# Patient Record
Sex: MALE | Race: BLACK OR AFRICAN AMERICAN | NOT HISPANIC OR LATINO | Employment: FULL TIME | URBAN - METROPOLITAN AREA
[De-identification: names, ages, dates, MRNs, and addresses within clinical notes are randomized per-mention and may not be internally consistent; named-entity substitution may affect disease eponyms.]

---

## 2020-05-04 ENCOUNTER — TELEPHONE (OUTPATIENT)
Dept: UROLOGY | Facility: CLINIC | Age: 75
End: 2020-05-04

## 2020-05-04 NOTE — TELEPHONE ENCOUNTER
Spoke with pt and he advised that he wanted to get the same Rx as he did 3 years ago. Nurse advised that pt would have to be seen before Rx given since he has not had an appt since 2018. Pt verbalized understanding and appt scheduled 5/7/20. NB

## 2020-05-04 NOTE — TELEPHONE ENCOUNTER
Call returned and msg was left on VM to return call.     ----- Message from Venessa Olivas sent at 5/4/2020  1:20 PM CDT -----  Contact: Patient   Patient need to speak to the nurse regarding his meds. [phone]. Tks

## 2020-05-08 ENCOUNTER — OFFICE VISIT (OUTPATIENT)
Dept: UROLOGY | Facility: CLINIC | Age: 75
End: 2020-05-08
Payer: MEDICARE

## 2020-05-08 VITALS
SYSTOLIC BLOOD PRESSURE: 142 MMHG | BODY MASS INDEX: 16.7 KG/M2 | HEIGHT: 73 IN | WEIGHT: 126 LBS | HEART RATE: 59 BPM | DIASTOLIC BLOOD PRESSURE: 75 MMHG | RESPIRATION RATE: 18 BRPM

## 2020-05-08 DIAGNOSIS — N13.8 BPH WITH OBSTRUCTION/LOWER URINARY TRACT SYMPTOMS: Primary | ICD-10-CM

## 2020-05-08 DIAGNOSIS — N40.1 BPH WITH OBSTRUCTION/LOWER URINARY TRACT SYMPTOMS: Primary | ICD-10-CM

## 2020-05-08 LAB
BILIRUB UR QL STRIP: NEGATIVE
GLUCOSE UR QL STRIP: NEGATIVE
KETONES UR QL STRIP: NEGATIVE
LEUKOCYTE ESTERASE UR QL STRIP: NEGATIVE
PH, POC UA: 5.5
POC AMORP, URINE: ABNORMAL
POC BACTI, URINE: ABNORMAL
POC BLOOD, URINE: NEGATIVE
POC CASTS, URINE: ABNORMAL
POC CRYST, URINE: ABNORMAL
POC EPITH, URINE: ABNORMAL
POC HCG, URINE: ABNORMAL
POC HYALIN, URINE: 0 LPF
POC MUCUS, URINE: ABNORMAL
POC NITRATES, URINE: NEGATIVE
POC OTHER, URINE: ABNORMAL
POC RBC, URINE: 0 HPF
POC WBC, URINE: 0 HPF
PROT UR QL STRIP: NEGATIVE
SP GR UR STRIP: 1.02 (ref 1–1.03)
UROBILINOGEN UR STRIP-ACNC: 0.2 (ref 0.3–2.2)

## 2020-05-08 PROCEDURE — 1159F MED LIST DOCD IN RCRD: CPT | Mod: S$GLB,,, | Performed by: SPECIALIST

## 2020-05-08 PROCEDURE — 1101F PR PT FALLS ASSESS DOC 0-1 FALLS W/OUT INJ PAST YR: ICD-10-PCS | Mod: CPTII,S$GLB,, | Performed by: SPECIALIST

## 2020-05-08 PROCEDURE — 1126F AMNT PAIN NOTED NONE PRSNT: CPT | Mod: S$GLB,,, | Performed by: SPECIALIST

## 2020-05-08 PROCEDURE — 1159F PR MEDICATION LIST DOCUMENTED IN MEDICAL RECORD: ICD-10-PCS | Mod: S$GLB,,, | Performed by: SPECIALIST

## 2020-05-08 PROCEDURE — 1101F PT FALLS ASSESS-DOCD LE1/YR: CPT | Mod: CPTII,S$GLB,, | Performed by: SPECIALIST

## 2020-05-08 PROCEDURE — 1126F PR PAIN SEVERITY QUANTIFIED, NO PAIN PRESENT: ICD-10-PCS | Mod: S$GLB,,, | Performed by: SPECIALIST

## 2020-05-08 PROCEDURE — 99214 OFFICE O/P EST MOD 30 MIN: CPT | Mod: S$GLB,,, | Performed by: SPECIALIST

## 2020-05-08 PROCEDURE — 99214 PR OFFICE/OUTPT VISIT, EST, LEVL IV, 30-39 MIN: ICD-10-PCS | Mod: S$GLB,,, | Performed by: SPECIALIST

## 2020-05-08 RX ORDER — TAMSULOSIN HYDROCHLORIDE 0.4 MG/1
CAPSULE ORAL
COMMUNITY
Start: 2020-02-05 | End: 2020-06-15

## 2020-05-08 RX ORDER — ALPRAZOLAM 0.5 MG/1
TABLET ORAL
COMMUNITY
Start: 2020-03-30

## 2020-05-08 RX ORDER — DOXYLAMINE SUCCINATE AND PHENYLEPHRINE HYDROCHLORIDE 10.5; 1 MG/1; MG/1
1 TABLET ORAL 2 TIMES DAILY
COMMUNITY
Start: 2020-03-30

## 2020-05-08 RX ORDER — CELECOXIB 200 MG/1
CAPSULE ORAL
COMMUNITY
End: 2020-09-18

## 2020-05-08 RX ORDER — ESCITALOPRAM OXALATE 10 MG/1
TABLET ORAL
COMMUNITY
Start: 2020-03-31

## 2020-05-08 RX ORDER — ESZOPICLONE 3 MG/1
3 TABLET, FILM COATED ORAL NIGHTLY
COMMUNITY
Start: 2020-03-26

## 2020-05-08 RX ORDER — PREGABALIN 75 MG/1
CAPSULE ORAL
COMMUNITY
Start: 2020-04-09

## 2020-05-08 RX ORDER — CYCLOBENZAPRINE HCL 10 MG
TABLET ORAL
COMMUNITY
Start: 2020-03-30 | End: 2020-09-18

## 2020-05-08 NOTE — PROGRESS NOTES
Subjective:       Patient ID: Jian Regalado is a 75 y.o. male.    Chief Complaint: Other (Pt reports occasional difficulty when voiding. )      HPI: 75-year-old male patient known service Dr. Jon history of BPH with LUTS.  Patient is on Flomax 0.4 mg.  Patient continues to complain of intermittent difficulty starting his urinary stream.  States once it gets going he is doing okay.  Dr. Jon spoken to in the past about a workup to include cysto, uroflow, bladder scan.  Patient patient was not in agreement at that time.  He states he has been on Bactrim in the past which seemed to help but his last prescription was for 1 pill a day for 7 days over 100 dollars for that medicine (sounds like may have been Levaquin).  Today he states that he starting to feel symptoms of difficulty starting the stream.  He has no dysuria, frequency, urgency, incontinence or gross hematuria.  Rare nocturia x1.  Erections he states his wife is sexually inactive, so he has no concerns at this time.  No other urologic complaints       Past Medical History: History reviewed. No pertinent past medical history.    Past Surgical Historical:   Past Surgical History:   Procedure Laterality Date    HIP REPLACEMENT ARTHROPLASTY      NECK SURGERY          Medications:   Medication List with Changes/Refills   Current Medications    ALPRAZOLAM (XANAX) 0.5 MG TABLET        CELECOXIB (CELEBREX) 200 MG CAPSULE    celecoxib 200 mg capsule    CYCLOBENZAPRINE (FLEXERIL) 10 MG TABLET        ESCITALOPRAM OXALATE (LEXAPRO) 10 MG TABLET        ESZOPICLONE (LUNESTA) 3 MG TAB    Take 3 mg by mouth nightly.    POLY HIST FORTE 10.5-10 MG TAB    Take 1 tablet by mouth 2 (two) times daily.    PREGABALIN (LYRICA) 75 MG CAPSULE        TAMSULOSIN (FLOMAX) 0.4 MG CAP        TIOTROPIUM BROMIDE (SPIRIVA RESPIMAT) 2.5 MCG/ACTUATION MIST    Spiriva Respimat 2.5 mcg/actuation solution for inhalation        Past Social History:   Social History     Socioeconomic History     Marital status:      Spouse name: Not on file    Number of children: Not on file    Years of education: Not on file    Highest education level: Not on file   Occupational History    Not on file   Social Needs    Financial resource strain: Not on file    Food insecurity:     Worry: Not on file     Inability: Not on file    Transportation needs:     Medical: Not on file     Non-medical: Not on file   Tobacco Use    Smoking status: Current Every Day Smoker     Types: Cigarettes    Smokeless tobacco: Never Used   Substance and Sexual Activity    Alcohol use: Not on file    Drug use: Not on file    Sexual activity: Not on file   Lifestyle    Physical activity:     Days per week: Not on file     Minutes per session: Not on file    Stress: Not on file   Relationships    Social connections:     Talks on phone: Not on file     Gets together: Not on file     Attends Protestant service: Not on file     Active member of club or organization: Not on file     Attends meetings of clubs or organizations: Not on file     Relationship status: Not on file   Other Topics Concern    Not on file   Social History Narrative    Not on file       Allergies: Review of patient's allergies indicates:  No Known Allergies     Family History: History reviewed. No pertinent family history.     Review of Systems:  Review of Systems   Constitutional: Negative for activity change and appetite change.   HENT: Negative for congestion and dental problem.    Respiratory: Negative for chest tightness and shortness of breath.    Cardiovascular: Negative for chest pain.   Gastrointestinal: Negative for abdominal distention and abdominal pain.   Genitourinary: Negative for decreased urine volume, difficulty urinating, discharge, dysuria, enuresis, flank pain, frequency, genital sores, hematuria, penile pain, penile swelling, scrotal swelling, testicular pain and urgency.   Musculoskeletal: Negative for back pain and neck pain.    Neurological: Negative for dizziness.   Hematological: Negative for adenopathy.   Psychiatric/Behavioral: Negative for agitation, behavioral problems and confusion.       Physical Exam:  Physical Exam   Constitutional: He is oriented to person, place, and time. He appears well-developed and well-nourished.   HENT:   Head: Normocephalic.   Eyes: No scleral icterus.   Neck: Normal range of motion.   Cardiovascular: Intact distal pulses.    Pulmonary/Chest: Effort normal and breath sounds normal.   Abdominal: Soft. He exhibits no distension. There is no tenderness. No hernia. Hernia confirmed negative in the right inguinal area and confirmed negative in the left inguinal area.   Genitourinary: Testes normal and penis normal. Cremasteric reflex is present.   Neurological: He is alert and oriented to person, place, and time.   Skin: Skin is warm and dry.     Psychiatric: He has a normal mood and affect.       Assessment/Plan:   BPH with LUTS--urinalysis today is negative across the board; written order for serum PSA to be drawn a path lab (per patient request); patient now in agreement with proceeding cysto uroflow bladder scan this will be scheduled for next week.  In the meantime I have asked the patient to increase his Flomax to 0.8 mg with evening meal, and he can report outcome to Dr. Jon at the time of his [facility] workup  Problem List Items Addressed This Visit     None

## 2020-05-14 ENCOUNTER — PROCEDURE VISIT (OUTPATIENT)
Dept: UROLOGY | Facility: CLINIC | Age: 75
End: 2020-05-14
Payer: MEDICARE

## 2020-05-14 VITALS
SYSTOLIC BLOOD PRESSURE: 146 MMHG | RESPIRATION RATE: 20 BRPM | WEIGHT: 125 LBS | HEART RATE: 64 BPM | HEIGHT: 74 IN | OXYGEN SATURATION: 96 % | DIASTOLIC BLOOD PRESSURE: 74 MMHG | BODY MASS INDEX: 16.04 KG/M2

## 2020-05-14 DIAGNOSIS — N40.1 BPH WITH OBSTRUCTION/LOWER URINARY TRACT SYMPTOMS: Primary | ICD-10-CM

## 2020-05-14 DIAGNOSIS — N13.8 BPH WITH OBSTRUCTION/LOWER URINARY TRACT SYMPTOMS: Primary | ICD-10-CM

## 2020-05-14 PROCEDURE — 52000 CYSTOURETHROSCOPY: CPT | Mod: S$GLB,,, | Performed by: SPECIALIST

## 2020-05-14 PROCEDURE — 51741 ELECTRO-UROFLOWMETRY FIRST: CPT | Mod: 51,S$GLB,, | Performed by: SPECIALIST

## 2020-05-14 PROCEDURE — 51741 PR UROFLOWMETRY, COMPLEX: ICD-10-PCS | Mod: 51,S$GLB,, | Performed by: SPECIALIST

## 2020-05-14 PROCEDURE — 52000 CYSTOSCOPY: ICD-10-PCS | Mod: S$GLB,,, | Performed by: SPECIALIST

## 2020-05-14 RX ORDER — CIPROFLOXACIN 500 MG/1
500 TABLET ORAL
Status: COMPLETED | OUTPATIENT
Start: 2020-05-14 | End: 2020-05-14

## 2020-05-14 RX ADMIN — CIPROFLOXACIN 500 MG: 500 TABLET ORAL at 02:05

## 2020-05-14 NOTE — PROCEDURES
"Cystoscopy  Date/Time: 5/14/2020 2:40 PM  Performed by: Shaun Jon MD  Authorized by: Shaun Jon MD     Consent Done?:  Yes (Written)  Time out: Immediately prior to procedure a "time out" was called to verify the correct patient, procedure, equipment, support staff and site/side marked as required.    Indications: BPH    Position:  Supine  Anesthesia:  Intraurethral instillation  Preparation: Patient was prepped and draped in usual sterile fashion      Scope type:  Flexible cystoscope  External exam normal: Yes    Urethra normal: Yes    Prostate normal: No          Hyperplasia (Trilobar)(Length (cm):  3)        Positive Varices  Bladder neck normal: High bladder neck.  Bladder normal: Yes      Patient tolerance:  Patient tolerated the procedure well with no immediate complications  Complex Uroflow  Date/Time: 5/14/2020 2:40 PM  Performed by: Shaun Jon MD  Authorized by: Shaun Jon MD   Preparation: Patient was prepped and draped in the usual sterile fashion.  Local anesthesia used: no    Anesthesia:  Local anesthesia used: no    Sedation:  Patient sedated: no    Patient tolerance: Patient tolerated the procedure well with no immediate complications  Comments: Voided volume:  Less than 50 cc  Q max:  Approximately 3 cc/second  Residual:  125 cc  Flow curve:  Flat bread loaf tracing.    Assessment:  Bladder outflow obstruction from trilobar prostatic enlargement  Plan:  Transurethral resection of the prostate.  Patient has mostly a large median lobe.        "

## 2020-05-14 NOTE — PATIENT INSTRUCTIONS
Cystoscopy    Cystoscopy is a procedure that lets your doctor look directly inside your urethra and bladder. It can be used to:  · Help diagnose a problem with your urethra, bladder, or kidneys.  · Take a sample (biopsy) of bladder or urethral tissue.  · Treat certain problems (such as removing kidney stones).  · Place a stent to bypass an obstruction.  · Take special X-rays of the kidneys.  Based on the findings, your doctor may recommend other tests or treatments.  What is a cystoscope?  A cystoscope is a telescope-like instrument that contains lenses and fiberoptics (small glass wires that make bright light). The cystoscope may be straight and rigid, or flexible to bend around curves in the urethra. The doctor may look directly into the cystoscope, or project the image onto a monitor.  Getting ready  · Ask your doctor if you should stop taking any medicines before the procedure.  · Ask whether you should avoid eating or drinking anything after midnight before the procedure.  · Follow any other instructions your doctor gives you.  Tell your doctor before the exam if you:  · Take any medicines, such as aspirin or blood thinners  · Have allergies to any medicines  · Are pregnant   The procedure  Cystoscopy is done in the doctors office, surgery center, or hospital. The doctor and a nurse are present during the procedure. It takes only a few minutes, longer if a biopsy, X-ray, or treatment needs to be done.  During the procedure:  · You lie on an exam table on your back, knees bent and legs apart. You are covered with a drape.  · Your urethra and the area around it are washed. Anesthetic jelly may be applied to numb the urethra. Other pain medicine is usually not needed. In some cases, you may be offered a mild sedative to help you relax. If a more extensive procedure is to be done, such as a biopsy or kidney stone removal, general anesthesia may be needed.  · The cystoscope is inserted. A sterile fluid is put  into the bladder to expand it. You may feel pressure from this fluid.  · When the procedure is done, the cystoscope is removed.  After the procedure  If you had a sedative, general anesthesia, or spinal anesthesia, you must have someone drive you home. Once youre home:  · Drink plenty of fluids.  · You may have burning or light bleeding when you urinate--this is normal.  · Medicines may be prescribed to ease any discomfort or prevent infection. Take these as directed.  · Call your doctor if you have heavy bleeding or blood clots, burning that lasts more than a day, a fever over 100°F  (38° C), or trouble urinating.  Date Last Reviewed: 1/1/2017  © 1115-7345 [employer]. [address] [place] [address], [place] [address]. All rights reserved. This information is not intended as a substitute for professional medical care. Always follow your healthcare professional's instructions.

## 2020-06-03 NOTE — PROGRESS NOTES
Pt here today to sign consents and education given for Cysto/Turp procedure scheduled for 06-. Pt verbalized understanding.

## 2020-06-04 LAB
ANION GAP SERPL CALC-SCNC: 7 MMOL/L (ref 3–11)
APPEARANCE, UA: CLEAR
BASOPHILS NFR SNV MANUAL: 0.6 % (ref 0–3)
BILIRUB UR QL STRIP: NEGATIVE MG/DL
BUN SERPL-MCNC: 21 MG/DL (ref 7–18)
BUN/CREAT SERPL: 19.62 RATIO (ref 7–18)
CALCIUM BLD-MCNC: NEGATIVE MG/DL
CALCIUM SERPL-MCNC: 9.2 MG/DL (ref 8.8–10.5)
CHLORIDE SERPL-SCNC: 102 MMOL/L (ref 100–108)
CO2 SERPL-SCNC: 32 MMOL/L (ref 21–32)
COLOR UR: NORMAL
COVID-19 AB, IGM: NEGATIVE
CREAT SERPL-MCNC: 1.07 MG/DL (ref 0.7–1.3)
EOSINOPHIL NFR SNV MANUAL: 2.9 % (ref 1–3)
ERYTHROCYTE [DISTWIDTH] IN BLOOD BY AUTOMATED COUNT: 13.2 % (ref 12.5–18)
GFR ESTIMATION: > 60
GLUCOSE (UA): NORMAL MG/DL
GLUCOSE SERPL-MCNC: 106 MG/DL (ref 70–110)
HCT VFR BLD AUTO: 40.6 % (ref 42–52)
HGB BLD-MCNC: 13.2 G/DL (ref 14–18)
HGB UR QL STRIP: NEGATIVE /UL
KETONES UR QL STRIP: NEGATIVE MG/DL
LEUKOCYTE ESTERASE UR QL STRIP: NEGATIVE /UL
LYMPHOCYTES NFR SNV MANUAL: 26.3 % (ref 25–40)
MANUAL NRBC PER 100 CELLS: 0 %
MCH RBC QN AUTO: 29.7 PG (ref 27–31.2)
MCHC RBC AUTO-ENTMCNC: 32.5 G/DL (ref 31.8–35.4)
MCV RBC AUTO: 91.2 FL (ref 80–97)
MONOCYTES/100 LEUKOCYTES: 11.2 % (ref 1–15)
NEUTROPHILS NFR BLD: 2.85 10*3/UL (ref 1.8–7.7)
NEUTROPHILS NFR SNV MANUAL: 59 % (ref 37–80)
NITRITE UR QL STRIP: NEGATIVE
PH UR STRIP: 5 PH (ref 5–9)
PLATELETS: 209 10*3/UL (ref 142–424)
POTASSIUM SERPL-SCNC: 4.4 MMOL/L (ref 3.6–5.2)
PROT UR QL STRIP: NEGATIVE MG/DL
RBC # BLD AUTO: 4.45 10*6/UL (ref 4.7–6.1)
SODIUM BLD-SCNC: 141 MMOL/L (ref 135–145)
SP GR UR STRIP: 1.02 (ref 1–1.03)
SPECIMEN COLLECTION METHOD, URINE: NORMAL
UROBILINOGEN UR STRIP-ACNC: NORMAL MG/DL
WBC # BLD: 4.8 10*3/UL (ref 4.6–10.2)

## 2020-06-09 ENCOUNTER — OUTSIDE PLACE OF SERVICE (OUTPATIENT)
Dept: UROLOGY | Facility: CLINIC | Age: 75
End: 2020-06-09
Payer: MEDICARE

## 2020-06-09 PROCEDURE — 52601 PROSTATECTOMY (TURP): CPT | Mod: ,,, | Performed by: SPECIALIST

## 2020-06-09 PROCEDURE — 52601 PR TRANSURETHRAL ELEC-SURG PROSTATECTOM: ICD-10-PCS | Mod: ,,, | Performed by: SPECIALIST

## 2020-06-10 LAB
ANION GAP SERPL CALC-SCNC: 3 MMOL/L (ref 3–11)
BUN SERPL-MCNC: 15 MG/DL (ref 7–18)
BUN/CREAT SERPL: 15.62 RATIO (ref 7–18)
CALCIUM SERPL-MCNC: 7.9 MG/DL (ref 8.8–10.5)
CHLORIDE SERPL-SCNC: 105 MMOL/L (ref 100–108)
CO2 SERPL-SCNC: 33 MMOL/L (ref 21–32)
CREAT SERPL-MCNC: 0.96 MG/DL (ref 0.7–1.3)
ERYTHROCYTE [DISTWIDTH] IN BLOOD BY AUTOMATED COUNT: 13.4 % (ref 12.5–18)
GFR ESTIMATION: > 60
GLUCOSE SERPL-MCNC: 96 MG/DL (ref 70–110)
HCT VFR BLD AUTO: 36.9 % (ref 42–52)
HGB BLD-MCNC: 11.3 G/DL (ref 14–18)
MANUAL NRBC PER 100 CELLS: 0 %
MCH RBC QN AUTO: 28.8 PG (ref 27–31.2)
MCHC RBC AUTO-ENTMCNC: 30.6 G/DL (ref 31.8–35.4)
MCV RBC AUTO: 94.1 FL (ref 80–97)
PLATELETS: 168 10*3/UL (ref 142–424)
POTASSIUM SERPL-SCNC: 4.5 MMOL/L (ref 3.6–5.2)
RBC # BLD AUTO: 3.92 10*6/UL (ref 4.7–6.1)
SODIUM BLD-SCNC: 141 MMOL/L (ref 135–145)
WBC # BLD: 6.7 10*3/UL (ref 4.6–10.2)

## 2020-06-15 ENCOUNTER — TELEPHONE (OUTPATIENT)
Dept: UROLOGY | Facility: CLINIC | Age: 75
End: 2020-06-15

## 2020-06-15 ENCOUNTER — OFFICE VISIT (OUTPATIENT)
Dept: UROLOGY | Facility: CLINIC | Age: 75
End: 2020-06-15
Payer: MEDICARE

## 2020-06-15 VITALS
DIASTOLIC BLOOD PRESSURE: 82 MMHG | BODY MASS INDEX: 16.05 KG/M2 | RESPIRATION RATE: 18 BRPM | SYSTOLIC BLOOD PRESSURE: 122 MMHG | WEIGHT: 125 LBS | HEART RATE: 76 BPM

## 2020-06-15 DIAGNOSIS — Z90.79 S/P TURP: ICD-10-CM

## 2020-06-15 DIAGNOSIS — N47.2 PARAPHIMOSIS: ICD-10-CM

## 2020-06-15 DIAGNOSIS — N40.1 BPH WITH OBSTRUCTION/LOWER URINARY TRACT SYMPTOMS: Primary | ICD-10-CM

## 2020-06-15 DIAGNOSIS — N13.8 BPH WITH OBSTRUCTION/LOWER URINARY TRACT SYMPTOMS: Primary | ICD-10-CM

## 2020-06-15 PROCEDURE — 99024 PR POST-OP FOLLOW-UP VISIT: ICD-10-PCS | Mod: S$GLB,,, | Performed by: SPECIALIST

## 2020-06-15 PROCEDURE — 54450 PR PREPUTIAL STRETCHING: ICD-10-PCS | Mod: S$GLB,,, | Performed by: SPECIALIST

## 2020-06-15 PROCEDURE — 54450 PREPUTIAL STRETCHING: CPT | Mod: S$GLB,,, | Performed by: SPECIALIST

## 2020-06-15 PROCEDURE — 99024 POSTOP FOLLOW-UP VISIT: CPT | Mod: S$GLB,,, | Performed by: SPECIALIST

## 2020-06-15 RX ORDER — HYOSCYAMINE SULFATE 0.12 MG/1
TABLET SUBLINGUAL
COMMUNITY
Start: 2020-06-10 | End: 2020-08-07 | Stop reason: SDUPTHER

## 2020-06-15 RX ORDER — HYDROCODONE BITARTRATE AND ACETAMINOPHEN 5; 325 MG/1; MG/1
TABLET ORAL
COMMUNITY
Start: 2020-06-10 | End: 2020-09-18

## 2020-06-15 RX ORDER — MELOXICAM 15 MG/1
TABLET ORAL
COMMUNITY
Start: 2020-03-30

## 2020-06-15 NOTE — PROGRESS NOTES
Chief Complaint:   Chief Complaint   Patient presents with    Follow-up     surgery f/u, wants to remove adams       HPI:  75-year-old  male known to the service of Dr. Jon who presents for post op visit with Adams catheter removal.  He underwent TURP on June 9, 2024 bladder outflow obstruction due to trilobar prostatic enlargement.  Adams catheter was left in place at discharge.  He reports pain at insertion site with swelling of his penis.  Pathology results revealed removal of 30.2 gm of benign prostatic tissue.  He denies any other urological complaints today.      Allergies:  Patient has no known allergies.    Medications:  has a current medication list which includes the following prescription(s): alprazolam, celecoxib, cyclobenzaprine, escitalopram oxalate, eszopiclone, hydrocodone-acetaminophen, hyoscyamine, meloxicam, poly hist forte, pregabalin, and tiotropium bromide.    Review of Systems:  General: No fever, chills, vision changes, dizziness, weakness, fatigue, unexplained weight loss, confusion, or mood swings.  Skin: No rashes, itching, or changes in color/texture of skin.  Chest: Denies SHEA, cyanosis, wheezing, cough, and sputum production.  Abdomen: Appetite fine. Denies diarrhea, abdominal pain, hematemesis, or blood in stool.  Musculoskeletal: No joint stiffness or swelling. No painful lymph nodes.  : reviewed and negative except as stated above in the HPI.  All other review of systems negative.    PMH:   has a past medical history of BPH with urinary obstruction.    PSH:   has a past surgical history that includes Neck surgery; Hip replacement arthroplasty; and Transurethral resection of prostate.    FamHx: Family history is unknown by patient.    SocHx:  reports that he has been smoking cigarettes. He has never used smokeless tobacco. No history on file for alcohol and drug.      Physical Exam:  Vitals:    06/15/20 1116   BP: 122/82   Pulse: 76   Resp: 18     General: AAOx3,  no apparent distress, no deformities  Neck: supple, no masses, normal thyroid, full ROM  Lungs: CTAB, no adventitious breath sounds, normal inspiration, no use of accessory muscles  Heart: regular rate and rhythm, no arrhythmias  Abdomen: soft, NT, ND, no masses, no hernias, no hepatosplenomegaly  Lymphatic: no unusually enlarged or tender lymph nodes  Skin: warm and dry, no jaundice, no rash  Ext: without edema or deformity, QUINTANA, ambulates independently  : Reyes catheter in place    Labs/Studies: path results as above    Impression/Plan:   BPH with obstruction/lower urinary tract symptoms  Comments:  s/p TURP, catheter removed by Dr. Jon in clinic today, f/u 6 weeks    S/P TURP  Comments:  performed on 6/9/2020, path report reveals removal of 30.2gm of benign prostatic tissue        Follow up in about 6 weeks (around 7/27/2020).

## 2020-06-15 NOTE — PROGRESS NOTES
Patient seen and examined.  Clinical data reviewed.  Case discussed with the nurse practitioner.  I agree with her assessment and plan.    Briefly 75-year-old man with BPH and outflow obstruction.  He had a very large median lobe.  He presented for TURP last week.  He is here today for catheter removal.  We reviewed his pathology.  He had a resection of about 30 g of benign prostatic tissue removed.  Today his complaint of penile pain.  Evaluated him and he had a paraphimosis.  We did do a reduction of the paraphimosis and also to CT the catheter.  He will return to follow up in 6 weeks.    The foreskin was held under tension.  The edema of the foreskin was then squeezed out manually.  The foreskin was then manipulated and stretched.  He was pulled over the glans.  The paraphimosis was successfully reduced.  Patient tolerated the procedure without complications.

## 2020-06-15 NOTE — TELEPHONE ENCOUNTER
Pt presented to office on 06/15/2020 for appt.     ----- Message from Francoise Mcdonald sent at 6/15/2020  8:33 AM CDT -----  Regarding: cath removal and appt  Caller is requesting a call back regarding an appt and his cath being removed. Please call back at [phone]. Thanks.

## 2020-06-19 ENCOUNTER — TELEPHONE (OUTPATIENT)
Dept: UROLOGY | Facility: CLINIC | Age: 75
End: 2020-06-19

## 2020-06-19 ENCOUNTER — CLINICAL SUPPORT (OUTPATIENT)
Dept: UROLOGY | Facility: CLINIC | Age: 75
End: 2020-06-19
Payer: MEDICARE

## 2020-06-19 DIAGNOSIS — N40.1 BPH WITH OBSTRUCTION/LOWER URINARY TRACT SYMPTOMS: Primary | ICD-10-CM

## 2020-06-19 DIAGNOSIS — N13.8 BPH WITH OBSTRUCTION/LOWER URINARY TRACT SYMPTOMS: Primary | ICD-10-CM

## 2020-06-19 LAB
BILIRUB UR QL STRIP: POSITIVE
CLARITY, POC UA: ABNORMAL
COLOR, POC UA: ABNORMAL
GLUCOSE UR QL STRIP: NEGATIVE
KETONES UR QL STRIP: POSITIVE
LEUKOCYTE ESTERASE UR QL STRIP: NEGATIVE
NITRITE, POC UA: ABNORMAL
PH, POC UA: 6.5
POC AMORP, URINE: ABNORMAL
POC BACTI, URINE: ABNORMAL
POC BLOOD, URINE: POSITIVE
POC CASTS, URINE: ABNORMAL
POC CRYST, URINE: ABNORMAL
POC EPITH, URINE: ABNORMAL
POC HCG, URINE: ABNORMAL
POC HYALIN, URINE: ABNORMAL
POC MUCUS, URINE: ABNORMAL
POC NITRATES, URINE: NEGATIVE
POC OTHER, URINE: ABNORMAL
POC RBC, URINE: ABNORMAL HPF
POC WBC, URINE: ABNORMAL HPF
PROT UR QL STRIP: POSITIVE
SP GR UR STRIP: 1.02 (ref 1–1.03)
UROBILINOGEN UR STRIP-ACNC: ABNORMAL (ref 0.3–2.2)

## 2020-06-19 PROCEDURE — 81001 URINALYSIS AUTO W/SCOPE: CPT | Mod: S$GLB,,, | Performed by: NURSE PRACTITIONER

## 2020-06-19 PROCEDURE — 81001 PR  URINALYSIS, AUTO, W/SCOPE: ICD-10-PCS | Mod: S$GLB,,, | Performed by: NURSE PRACTITIONER

## 2020-06-19 NOTE — TELEPHONE ENCOUNTER
Contacted pt. Pt adv no COVID symptoms.-Pt wants refill on antibiotic that was given for his procedure. Pt states that it takes a while to urinate and when it starts it hurts him. Adv pt to pass by and give us a urine sample for an UA test. Pt will come by before 5pm today. Verbalized understanding.    ABW    ---- Message from Pepper Nguyen sent at 6/15/2020  4:10 PM CDT -----  Regarding: COVID FU  SURGERY-6/9/2020

## 2020-06-22 ENCOUNTER — TELEPHONE (OUTPATIENT)
Dept: UROLOGY | Facility: CLINIC | Age: 75
End: 2020-06-22

## 2020-06-22 NOTE — TELEPHONE ENCOUNTER
Call returned and spoke w/ pt. Pt is wanting to know results of UA and treatment. Pt was informed that once UA culture results come back, treatment will be determined by provider. Pt verbalized understanding.     ----- Message from Ivan Rader sent at 6/22/2020  9:47 AM CDT -----  Pt would like return call , regarding antibiotic medication. Please call back at [phone].  Kizzy Shine

## 2020-06-23 ENCOUNTER — TELEPHONE (OUTPATIENT)
Dept: UROLOGY | Facility: CLINIC | Age: 75
End: 2020-06-23

## 2020-06-23 NOTE — TELEPHONE ENCOUNTER
Spoke with pt and informed him that urine culture was negative. Pt inquired about flomax and thought that CHRISTI advised him that he would not have to take Rx anymore after surgery. Nurse verbalized that she would reach out to CHRISTI and contact pt back. Pt verbalized understanding. NB

## 2020-07-07 ENCOUNTER — OFFICE VISIT (OUTPATIENT)
Dept: UROLOGY | Facility: CLINIC | Age: 75
End: 2020-07-07
Payer: MEDICARE

## 2020-07-07 VITALS
RESPIRATION RATE: 18 BRPM | HEIGHT: 74 IN | BODY MASS INDEX: 16.04 KG/M2 | WEIGHT: 125 LBS | HEART RATE: 91 BPM | SYSTOLIC BLOOD PRESSURE: 119 MMHG | DIASTOLIC BLOOD PRESSURE: 82 MMHG

## 2020-07-07 DIAGNOSIS — N40.1 BPH WITH OBSTRUCTION/LOWER URINARY TRACT SYMPTOMS: ICD-10-CM

## 2020-07-07 DIAGNOSIS — N13.8 BPH WITH OBSTRUCTION/LOWER URINARY TRACT SYMPTOMS: ICD-10-CM

## 2020-07-07 DIAGNOSIS — R30.0 DYSURIA: Primary | ICD-10-CM

## 2020-07-07 DIAGNOSIS — N52.9 ERECTILE DYSFUNCTION, UNSPECIFIED ERECTILE DYSFUNCTION TYPE: ICD-10-CM

## 2020-07-07 LAB
BILIRUB UR QL STRIP: NEGATIVE
CLARITY, POC UA: ABNORMAL
COLOR, POC UA: YELLOW
GLUCOSE UR QL STRIP: NEGATIVE
KETONES UR QL STRIP: NEGATIVE
LEUKOCYTE ESTERASE UR QL STRIP: POSITIVE
NITRITE, POC UA: ABNORMAL
PH, POC UA: 6.5
POC AMORP, URINE: 0
POC BACTI, URINE: ABNORMAL
POC BLOOD, URINE: POSITIVE
POC CASTS, URINE: 0
POC CRYST, URINE: 0
POC EPITH, URINE: ABNORMAL
POC HCG, URINE: ABNORMAL
POC HYALIN, URINE: 0 LPF
POC MUCUS, URINE: ABNORMAL
POC NITRATES, URINE: POSITIVE
POC OTHER, URINE: 0
POC RBC, URINE: ABNORMAL HPF
POC RESIDUAL URINE VOLUME: 154 ML (ref 0–100)
POC RESIDUAL URINE VOLUME: 239 ML (ref 0–100)
POC WBC, URINE: ABNORMAL HPF
PROT UR QL STRIP: POSITIVE
SP GR UR STRIP: 1.01 (ref 1–1.03)
TESTOST SERPL-MCNC: 379 NG/DL (ref 193–740)
UROBILINOGEN UR STRIP-ACNC: 0.2 (ref 0.3–2.2)

## 2020-07-07 PROCEDURE — 51798 US URINE CAPACITY MEASURE: CPT | Mod: S$GLB,,, | Performed by: NURSE PRACTITIONER

## 2020-07-07 PROCEDURE — 51798 PR MEAS,POST-VOID RES,US,NON-IMAGING: ICD-10-PCS | Mod: S$GLB,,, | Performed by: NURSE PRACTITIONER

## 2020-07-07 PROCEDURE — 3008F PR BODY MASS INDEX (BMI) DOCUMENTED: ICD-10-PCS | Mod: CPTII,S$GLB,, | Performed by: NURSE PRACTITIONER

## 2020-07-07 PROCEDURE — 1126F AMNT PAIN NOTED NONE PRSNT: CPT | Mod: S$GLB,,, | Performed by: NURSE PRACTITIONER

## 2020-07-07 PROCEDURE — 1101F PT FALLS ASSESS-DOCD LE1/YR: CPT | Mod: CPTII,S$GLB,, | Performed by: NURSE PRACTITIONER

## 2020-07-07 PROCEDURE — 36415 COLL VENOUS BLD VENIPUNCTURE: CPT | Mod: S$GLB,,, | Performed by: NURSE PRACTITIONER

## 2020-07-07 PROCEDURE — 1159F MED LIST DOCD IN RCRD: CPT | Mod: S$GLB,,, | Performed by: NURSE PRACTITIONER

## 2020-07-07 PROCEDURE — 99213 PR OFFICE/OUTPT VISIT, EST, LEVL III, 20-29 MIN: ICD-10-PCS | Mod: 25,S$GLB,, | Performed by: NURSE PRACTITIONER

## 2020-07-07 PROCEDURE — 3008F BODY MASS INDEX DOCD: CPT | Mod: CPTII,S$GLB,, | Performed by: NURSE PRACTITIONER

## 2020-07-07 PROCEDURE — 1126F PR PAIN SEVERITY QUANTIFIED, NO PAIN PRESENT: ICD-10-PCS | Mod: S$GLB,,, | Performed by: NURSE PRACTITIONER

## 2020-07-07 PROCEDURE — 99213 OFFICE O/P EST LOW 20 MIN: CPT | Mod: 25,S$GLB,, | Performed by: NURSE PRACTITIONER

## 2020-07-07 PROCEDURE — 1101F PR PT FALLS ASSESS DOC 0-1 FALLS W/OUT INJ PAST YR: ICD-10-PCS | Mod: CPTII,S$GLB,, | Performed by: NURSE PRACTITIONER

## 2020-07-07 PROCEDURE — 81001 URINALYSIS AUTO W/SCOPE: CPT | Mod: S$GLB,,, | Performed by: NURSE PRACTITIONER

## 2020-07-07 PROCEDURE — 1159F PR MEDICATION LIST DOCUMENTED IN MEDICAL RECORD: ICD-10-PCS | Mod: S$GLB,,, | Performed by: NURSE PRACTITIONER

## 2020-07-07 PROCEDURE — 36415 PR COLLECTION VENOUS BLOOD,VENIPUNCTURE: ICD-10-PCS | Mod: S$GLB,,, | Performed by: NURSE PRACTITIONER

## 2020-07-07 PROCEDURE — 81001 PR  URINALYSIS, AUTO, W/SCOPE: ICD-10-PCS | Mod: S$GLB,,, | Performed by: NURSE PRACTITIONER

## 2020-07-07 NOTE — PROGRESS NOTES
Chief Complaint:   Chief Complaint   Patient presents with    Erectile Dysfunction     after TURP/approx one month       HPI:  75-year-old  male known to the service of Dr. Jon who presents with complaints of erectile dysfunction.  He states that the issue started after his recent procedure.  He underwent TURP on June 9, 2020 for bladder outflow obstruction due to trilobar prostatic enlargement. Pathology results revealed removal of 30.2 gm of benign prostatic tissue.  Reyes catheter was left in place postoperatively.  When he returned to clinic on [place] 15, 2020 he was experiencing penile pain and after evaluation by MD he was found to have paraphimosis and a reduction of the paraphimosis in clinic was performed.  He denies any further swelling of his penis.  He is able to retract the foreskin easily.    He states that prior to surgery he was not experiencing any problems with his erections.  He explains that now he is able to initiate an erection but he does not get as full as he was before and is unable to reach climax during intercourse.  He explains that at his last visit with his PCP around 3 months ago he was given an injection of testosterone and was supposed to followup for lab values/ prescription (for home administration by wife as she is a nurse) but has been unable to do so due to the pandemic.  Explained that we normally do not prescribe testosterone for home administration but we will evaluate his testosterone levels today.    Upon further questioning about his urination, he explains that his urination pattern is about the same postoperatively as it was prior to surgery.  He admits to hesitancy and difficulty emptying.  He states that he sits on the toilet and is able to empty his bladder FCI but then he has to sit for a while and usually complete urination 4-5 minutes later.  He denies any hematuria.  He feels like there is a blockage at the head of his penis as he experiences  "pain when the urine reaches about an inch to the end".  He states sometimes he has to relax and push on his bladder then he is able to finish urination.  He denies a history of diabetes.  He does have peripheral neuropathy maintained on Lyrica.     He denies any other urological complaints today.  No fever chills.    Allergies:  Patient has no known allergies.    Medications:  has a current medication list which includes the following prescription(s): alprazolam, celecoxib, cyclobenzaprine, escitalopram oxalate, eszopiclone, hydrocodone-acetaminophen, hyoscyamine, meloxicam, poly hist forte, pregabalin, and tiotropium bromide.    Review of Systems:  General: No fever, chills, vision changes, dizziness, weakness, fatigue, unexplained weight loss, confusion, or mood swings.  Skin: No rashes, itching, or changes in color/texture of skin.  Chest: Denies SHEA, cyanosis, wheezing, cough, and sputum production.  Abdomen: Appetite fine. Denies diarrhea, abdominal pain, hematemesis, or blood in stool.  Musculoskeletal: No joint stiffness or swelling. No painful lymph nodes.  : reviewed and negative except as stated above in the HPI.  All other review of systems negative.    PMH:   has a past medical history of Arthritis, BPH with urinary obstruction, and Neuropathy.    PSH:   has a past surgical history that includes Neck surgery; Hip replacement arthroplasty; and Transurethral resection of prostate.    FamHx: Family history is unknown by patient.    SocHx:  reports that he has been smoking cigarettes. He has never used smokeless tobacco. No history on file for alcohol and drug.      Physical Exam:  Vitals:    07/07/20 1104   BP: 119/82   Pulse: 91   Resp: 18     General: AAOx3, no apparent distress, thin, no deformities  Neck: supple, no masses, normal thyroid, full ROM  Lungs: CTAB, no adventitious breath sounds, normal inspiration, no use of accessory muscles  Heart: regular rate and rhythm, no arrhythmias  Abdomen: " soft, NT, ND, no masses, no hernias, no hepatosplenomegaly  Lymphatic: no unusually enlarged or tender lymph nodes  Skin: warm and dry, no jaundice, no rash  Ext: without edema or deformity, QUINTANA, ambulates independently  : deferred    Labs/Studies: UA voided sample shows WBCs TNTC/hpf, RBCs 50-60/hpf, epi +/-, bact 2+, mucus 1+; bladder scan PV 239mL then after double void 154mL    Impression/Plan:   Dysuria  Comments:  experiencing hesitancy and incomplete emptying, pain at the end of urination, UA findings c/w possible UTI- will send for culture and cc chart to MD for review  Orders:  -     Urine culture    BPH with obstruction/lower urinary tract symptoms  Comments:  s/p TURP, no need for Flomax, bladder scan PV 239mL then after double void 154mL  Orders:  -     POCT Urinalysis (w/Micro Option)  -     POCT Bladder Scan  -     POCT Bladder Scan  -     Urine culture    Erectile dysfunction, unspecified erectile dysfunction type  Comments:  reports as a new issue since surgery, has not tried any medications, will check Testosterone level and plan for further evaluation after acute issues resolve  Orders:  -     Testosterone        Follow up in about 20 days (around 7/27/2020) for f/u appt already scheduled with Dr. Jon.

## 2020-07-09 NOTE — PROGRESS NOTES
"Sorry I wasn't clear I forgot to leave a note, the patient was concerned about ED and his voiding pattern so I just sent the chart to you for your review as he was concerned that something needed to be done for him quickly. It has only been a month since surgery, but do I need to evaluate the possible "blockage" that he is describing any further?  "

## 2020-07-09 NOTE — PROGRESS NOTES
OK is not unusual to have a meatal stenosis.  Sometimes a conform early on.  So bring him back to clinic when I am here sometime next week.

## 2020-07-10 ENCOUNTER — TELEPHONE (OUTPATIENT)
Dept: UROLOGY | Facility: CLINIC | Age: 75
End: 2020-07-10

## 2020-07-10 DIAGNOSIS — Z90.79 S/P TURP: ICD-10-CM

## 2020-07-10 DIAGNOSIS — R30.0 DYSURIA: Primary | ICD-10-CM

## 2020-07-10 LAB — URINE CULTURE, ROUTINE: NORMAL

## 2020-07-10 RX ORDER — CIPROFLOXACIN 500 MG/1
500 TABLET ORAL 2 TIMES DAILY
Qty: 14 TABLET | Refills: 0 | Status: SHIPPED | OUTPATIENT
Start: 2020-07-10 | End: 2020-07-17

## 2020-07-10 NOTE — PROGRESS NOTES
Patient is being treated with antibiotics and was previously scheduled for 7/27/2020 so I will treat him and keep his upcoming appt to discuss symptoms post antibiotics

## 2020-07-10 NOTE — TELEPHONE ENCOUNTER
Spoke with wife on patient's phone, notified of positive urine culture revealing staphylococcus epidermidis greater than 100,000. Antibiotics sent to pharmacy and patient needs to complete all medication even if he starts to feel better.  Follow-up appointment with MD confirmed and we will plan to recheck his urine at that time.    **POC discussed with Dr. Jon prior to calling pt/ordering antibiotics**

## 2020-07-15 ENCOUNTER — TELEPHONE (OUTPATIENT)
Dept: UROLOGY | Facility: CLINIC | Age: 75
End: 2020-07-15

## 2020-07-15 NOTE — TELEPHONE ENCOUNTER
Spoke with pt and states that since being prescribed ABT Rx his symptoms are not getting better at all and he is having pain near the penile area. States that he is having trouble with urinating since having procedure, states that he is never emptying. He requested to have an appt with CHRISTI. Nurse advised that she would put him on the schedule for 7/16/20. Pt verbalized understanding and appt scheduled. NB         ----- Message from Farrah Morris sent at 7/15/2020  8:59 AM CDT -----  Regarding: appt  Contact: self- [phone]  Would like to consult with the nurse, patient is in a lot of pain and would like to be seen Today,  patient would like a call back as soon as possible patient did not want to see the Np please call back at [phone], thanks sj

## 2020-07-16 ENCOUNTER — OFFICE VISIT (OUTPATIENT)
Dept: UROLOGY | Facility: CLINIC | Age: 75
End: 2020-07-16
Payer: MEDICARE

## 2020-07-16 ENCOUNTER — PROCEDURE VISIT (OUTPATIENT)
Dept: UROLOGY | Facility: CLINIC | Age: 75
End: 2020-07-16
Payer: MEDICARE

## 2020-07-16 VITALS
DIASTOLIC BLOOD PRESSURE: 68 MMHG | SYSTOLIC BLOOD PRESSURE: 120 MMHG | HEART RATE: 88 BPM | BODY MASS INDEX: 16.04 KG/M2 | HEIGHT: 74 IN | WEIGHT: 125 LBS | RESPIRATION RATE: 18 BRPM | OXYGEN SATURATION: 96 %

## 2020-07-16 VITALS
RESPIRATION RATE: 18 BRPM | HEIGHT: 74 IN | BODY MASS INDEX: 16.04 KG/M2 | DIASTOLIC BLOOD PRESSURE: 68 MMHG | HEART RATE: 88 BPM | SYSTOLIC BLOOD PRESSURE: 120 MMHG | WEIGHT: 125 LBS

## 2020-07-16 DIAGNOSIS — N40.1 BPH WITH OBSTRUCTION/LOWER URINARY TRACT SYMPTOMS: ICD-10-CM

## 2020-07-16 DIAGNOSIS — N13.8 BPH WITH OBSTRUCTION/LOWER URINARY TRACT SYMPTOMS: ICD-10-CM

## 2020-07-16 DIAGNOSIS — R33.9 INCOMPLETE BLADDER EMPTYING: Primary | ICD-10-CM

## 2020-07-16 DIAGNOSIS — R10.2 SUPRAPUBIC PRESSURE: ICD-10-CM

## 2020-07-16 PROCEDURE — 1101F PR PT FALLS ASSESS DOC 0-1 FALLS W/OUT INJ PAST YR: ICD-10-PCS | Mod: CPTII,S$GLB,, | Performed by: SPECIALIST

## 2020-07-16 PROCEDURE — 52281 CYSTOSCOPY AND TREATMENT: CPT | Mod: 58,S$GLB,, | Performed by: SPECIALIST

## 2020-07-16 PROCEDURE — 51798 US URINE CAPACITY MEASURE: CPT | Mod: S$GLB,,, | Performed by: SPECIALIST

## 2020-07-16 PROCEDURE — 1159F MED LIST DOCD IN RCRD: CPT | Mod: S$GLB,,, | Performed by: SPECIALIST

## 2020-07-16 PROCEDURE — 52281 PR CYSTOSCOPY,DIL URETHRAL STRICTURE: ICD-10-PCS | Mod: 58,S$GLB,, | Performed by: SPECIALIST

## 2020-07-16 PROCEDURE — 99024 POSTOP FOLLOW-UP VISIT: CPT | Mod: S$GLB,,, | Performed by: SPECIALIST

## 2020-07-16 PROCEDURE — 1159F PR MEDICATION LIST DOCUMENTED IN MEDICAL RECORD: ICD-10-PCS | Mod: S$GLB,,, | Performed by: SPECIALIST

## 2020-07-16 PROCEDURE — 1126F PR PAIN SEVERITY QUANTIFIED, NO PAIN PRESENT: ICD-10-PCS | Mod: S$GLB,,, | Performed by: SPECIALIST

## 2020-07-16 PROCEDURE — 1126F AMNT PAIN NOTED NONE PRSNT: CPT | Mod: S$GLB,,, | Performed by: SPECIALIST

## 2020-07-16 PROCEDURE — 1101F PT FALLS ASSESS-DOCD LE1/YR: CPT | Mod: CPTII,S$GLB,, | Performed by: SPECIALIST

## 2020-07-16 PROCEDURE — 99024 PR POST-OP FOLLOW-UP VISIT: ICD-10-PCS | Mod: S$GLB,,, | Performed by: SPECIALIST

## 2020-07-16 PROCEDURE — 51798 POCT BLADDER SCAN: ICD-10-PCS | Mod: S$GLB,,, | Performed by: SPECIALIST

## 2020-07-16 NOTE — PROGRESS NOTES
Subjective:       Patient ID: Jian Regalado is a 75 y.o. male.    Chief Complaint: Dysuria (started after cath removal), Urinary Retention (started after cath removal), and Erectile Dysfunction (started after procedure)      HPI:  75-year-old  man a severely enlarged prostate was now status post TURP on 06/09/2020 with removal 30 g of benign prostatic tissue.  The 1st couple of days he was voiding very well following the removal of his Reyes catheter but recently he is reporting that he does not feel like he is emptying his bladder.  Feels a constant suprapubic pressure.  He reports burning with urination.  He reports that it hurts when he pees.  Every time he sits down he feels like something is pushing on his bladder.  His stream is still pretty slow and not robust as a was when the Reyes catheter was immediately removed.  He feels like there is something in the way that is preventing him from having a good stream.  He denies any bleeding in the urine.    He was recently seen in the clinic for similar complaints her urine culture was sent that showed growth of Staph epidermidis.  He is currently on ciprofloxacin.    Past Medical History:   Past Medical History:   Diagnosis Date    Arthritis     BPH with urinary obstruction     Neuropathy        Past Surgical Historical:   Past Surgical History:   Procedure Laterality Date    HIP REPLACEMENT ARTHROPLASTY      NECK SURGERY      TRANSURETHRAL RESECTION OF PROSTATE          Medications:   Medication List with Changes/Refills   Current Medications    ALPRAZOLAM (XANAX) 0.5 MG TABLET        CELECOXIB (CELEBREX) 200 MG CAPSULE    celecoxib 200 mg capsule    CIPROFLOXACIN HCL (CIPRO) 500 MG TABLET    Take 1 tablet (500 mg total) by mouth 2 (two) times daily. for 7 days    CYCLOBENZAPRINE (FLEXERIL) 10 MG TABLET        ESCITALOPRAM OXALATE (LEXAPRO) 10 MG TABLET        ESZOPICLONE (LUNESTA) 3 MG TAB    Take 3 mg by mouth nightly.     HYDROCODONE-ACETAMINOPHEN (NORCO) 5-325 MG PER TABLET        HYOSCYAMINE (LEVSIN/SL) 0.125 MG SUBL    DISSOLVE 2 TABLETS IN MOUTH EVERY 4 HOURS AS NEEDED FOR BLADDER SPASMS    MELOXICAM (MOBIC) 15 MG TABLET        POLY HIST FORTE 10.5-10 MG TAB    Take 1 tablet by mouth 2 (two) times daily.    PREGABALIN (LYRICA) 75 MG CAPSULE        TIOTROPIUM BROMIDE (SPIRIVA RESPIMAT) 2.5 MCG/ACTUATION MIST    Spiriva Respimat 2.5 mcg/actuation solution for inhalation        Past Social History:   Social History     Socioeconomic History    Marital status:      Spouse name: Not on file    Number of children: Not on file    Years of education: Not on file    Highest education level: Not on file   Occupational History    Not on file   Social Needs    Financial resource strain: Not on file    Food insecurity     Worry: Not on file     Inability: Not on file    Transportation needs     Medical: Not on file     Non-medical: Not on file   Tobacco Use    Smoking status: Current Every Day Smoker     Types: Cigarettes    Smokeless tobacco: Never Used   Substance and Sexual Activity    Alcohol use: Not on file    Drug use: Not on file    Sexual activity: Not on file   Lifestyle    Physical activity     Days per week: Not on file     Minutes per session: Not on file    Stress: Not on file   Relationships    Social connections     Talks on phone: Not on file     Gets together: Not on file     Attends Jewish service: Not on file     Active member of club or organization: Not on file     Attends meetings of clubs or organizations: Not on file     Relationship status: Not on file   Other Topics Concern    Not on file   Social History Narrative    Not on file       Allergies: Review of patient's allergies indicates:  No Known Allergies     Family History:   Family History   Family history unknown: Yes        Review of Systems:   systems reviewed and notable for incomplete bladder emptying, poor urinary stream  All  other systems were reviewed Neg except as stated in the HPI    Physical Exam:  General: A&Ox3. No apparent distress. No deformities.  Neck: No masses. Normal thyroid.  Lungs: normal inspiration. No use of accessory muscles.  Heart: normal pulse. No arrhythmias.  Abdomen: Soft. NT. ND. No masses. No hernias. No hepatosplenomegaly.  Lymphatic: Neck and groin nodes negative.  Skin: The skin is warm and dry. No jaundice.  Neurology: Cranial nerves 2-12 crossly intact, no focal weaknesses, no sensation deficits, no motor deficits  Ext: No clubbing, cyanosis or edema.  :  Deferred      Assessment/Plan:       A 75-year-old man with a severely enlarged prostate who is now status post TURP on 06/09/2020 with removal of 30 g of tissue were continues to have voiding problems.    1.  Because he had a recent TURP I am suspicious that he might have a TURP chip lodged in his urethra and is causing obstruction.  So I will recommend a cystoscopy today in the clinic.  Patient has agreed to proceed.  2.  He was recently seen in the clinic several days ago urine culture showed growth of Staph epidermidis.  Patient is currently on ciprofloxacin.    Problem List Items Addressed This Visit     None      Visit Diagnoses     Incomplete bladder emptying    -  Primary    Relevant Orders    POCT Bladder Scan    POCT Urinalysis (w/Micro Option)    Suprapubic pressure        BPH with obstruction/lower urinary tract symptoms

## 2020-07-16 NOTE — PATIENT INSTRUCTIONS
Cystoscopy    Cystoscopy is a procedure that lets your doctor look directly inside your urethra and bladder. It can be used to:  · Help diagnose a problem with your urethra, bladder, or kidneys.  · Take a sample (biopsy) of bladder or urethral tissue.  · Treat certain problems (such as removing kidney stones).  · Place a stent to bypass an obstruction.  · Take special X-rays of the kidneys.  Based on the findings, your doctor may recommend other tests or treatments.  What is a cystoscope?  A cystoscope is a telescope-like instrument that contains lenses and fiberoptics (small glass wires that make bright light). The cystoscope may be straight and rigid, or flexible to bend around curves in the urethra. The doctor may look directly into the cystoscope, or project the image onto a monitor.  Getting ready  · Ask your doctor if you should stop taking any medicines before the procedure.  · Ask whether you should avoid eating or drinking anything after midnight before the procedure.  · Follow any other instructions your doctor gives you.  Tell your doctor before the exam if you:  · Take any medicines, such as aspirin or blood thinners  · Have allergies to any medicines  · Are pregnant   The procedure  Cystoscopy is done in the doctors office, surgery center, or hospital. The doctor and a nurse are present during the procedure. It takes only a few minutes, longer if a biopsy, X-ray, or treatment needs to be done.  During the procedure:  · You lie on an exam table on your back, knees bent and legs apart. You are covered with a drape.  · Your urethra and the area around it are washed. Anesthetic jelly may be applied to numb the urethra. Other pain medicine is usually not needed. In some cases, you may be offered a mild sedative to help you relax. If a more extensive procedure is to be done, such as a biopsy or kidney stone removal, general anesthesia may be needed.  · The cystoscope is inserted. A sterile fluid is put  into the bladder to expand it. You may feel pressure from this fluid.  · When the procedure is done, the cystoscope is removed.  After the procedure  If you had a sedative, general anesthesia, or spinal anesthesia, you must have someone drive you home. Once youre home:  · Drink plenty of fluids.  · You may have burning or light bleeding when you urinate--this is normal.  · Medicines may be prescribed to ease any discomfort or prevent infection. Take these as directed.  · Call your doctor if you have heavy bleeding or blood clots, burning that lasts more than a day, a fever over 100°F  (38° C), or trouble urinating.  Date Last Reviewed: 1/1/2017  © 8627-1476 [employer]. [address] [place] [address], [place] [address]. All rights reserved. This information is not intended as a substitute for professional medical care. Always follow your healthcare professional's instructions.

## 2020-07-16 NOTE — PROCEDURES
"Cystoscopy    Date/Time: 7/16/2020 8:20 AM  Performed by: Shaun Jon MD  Authorized by: Shaun Jon MD     Consent Done?:  Yes (Written)  Time out: Immediately prior to procedure a "time out" was called to verify the correct patient, procedure, equipment, support staff and site/side marked as required.    Indications: dysuria, BPH and urethral stricture    Position:  Supine  Anesthesia:  Intraurethral instillation  Preparation: Patient was prepped and draped in usual sterile fashion      Scope type:  Flexible cystoscope  External exam normal: Yes    Digital exam performed: No    Urethra normal: No (There was sequential dilation with Poly sounds of the meatus up to a size 20 Malagasy)         Urethral Internal Findings:  Stricture (Meatal stenosis)  Prostate normal: No (Residual prostatic tissue despite the TURP)          Hyperplasia (Trilobar)(Length (cm):  3)  Bladder neck normal: Evidence of recent TURP.  Bladder normal: Yes      Patient tolerance:  Patient tolerated the procedure well with no immediate complications       Meatal stenosis was noted there was sequential dilation using Poly sounds to size 20 Malagasy with patient tolerated without problems.    Assessment:  Severely enlarged prostate as well as meatal stenosis  Plan:  Patient will need either redo TURP or suprapubic prostatectomy.  His meatus was dilated today.  Patient was able to void 125 cc with a residual from catheterization of 150 cc.  We will be scheduling him for suprapubic prostatectomy on 08/18/2020      "

## 2020-07-17 LAB — POC RESIDUAL URINE VOLUME: 139 ML (ref 0–100)

## 2020-07-20 ENCOUNTER — TELEPHONE (OUTPATIENT)
Dept: UROLOGY | Facility: CLINIC | Age: 75
End: 2020-07-20

## 2020-07-20 NOTE — TELEPHONE ENCOUNTER
Pt contacted clinic and advised nurse that he would like to proceed with scheduling prostate procedure on next Wednesday. Nurse verbalized she will inform CHRISTI and will notify pt. Pt verbalized understanding. NB

## 2020-07-23 DIAGNOSIS — N13.8 BPH WITH OBSTRUCTION/LOWER URINARY TRACT SYMPTOMS: Primary | ICD-10-CM

## 2020-07-23 DIAGNOSIS — N40.1 BPH WITH OBSTRUCTION/LOWER URINARY TRACT SYMPTOMS: Primary | ICD-10-CM

## 2020-07-24 ENCOUNTER — CLINICAL SUPPORT (OUTPATIENT)
Dept: UROLOGY | Facility: CLINIC | Age: 75
End: 2020-07-24
Payer: MEDICARE

## 2020-07-24 DIAGNOSIS — N40.1 BPH WITH OBSTRUCTION/LOWER URINARY TRACT SYMPTOMS: Primary | ICD-10-CM

## 2020-07-24 DIAGNOSIS — N13.8 BPH WITH OBSTRUCTION/LOWER URINARY TRACT SYMPTOMS: Primary | ICD-10-CM

## 2020-07-24 LAB
ANION GAP SERPL CALC-SCNC: 8 MMOL/L (ref 3–11)
BASOPHILS NFR SNV MANUAL: 0.6 % (ref 0–3)
BUN SERPL-MCNC: 19 MG/DL (ref 7–18)
BUN/CREAT SERPL: 15.7 RATIO (ref 7–18)
CALCIUM BLD-MCNC: NEGATIVE MG/DL
CALCIUM SERPL-MCNC: 9.1 MG/DL (ref 8.8–10.5)
CHLORIDE SERPL-SCNC: 107 MMOL/L (ref 100–108)
CO2 SERPL-SCNC: 29 MMOL/L (ref 21–32)
COVID-19 AB, IGM: NEGATIVE
CREAT SERPL-MCNC: 1.21 MG/DL (ref 0.7–1.3)
EOSINOPHIL NFR SNV MANUAL: 4.5 % (ref 1–3)
ERYTHROCYTE [DISTWIDTH] IN BLOOD BY AUTOMATED COUNT: 14.5 % (ref 12.5–18)
GFR ESTIMATION: > 60
GLUCOSE SERPL-MCNC: 103 MG/DL (ref 70–110)
HCT VFR BLD AUTO: 33.3 % (ref 42–52)
HGB BLD-MCNC: 10.6 G/DL (ref 14–18)
LYMPHOCYTES NFR SNV MANUAL: 31.4 % (ref 25–40)
MANUAL NRBC PER 100 CELLS: 0 %
MCH RBC QN AUTO: 28.7 PG (ref 27–31.2)
MCHC RBC AUTO-ENTMCNC: 31.8 G/DL (ref 31.8–35.4)
MCV RBC AUTO: 90.2 FL (ref 80–97)
MONOCYTES/100 LEUKOCYTES: 9.2 % (ref 1–15)
NEUTROPHILS NFR BLD: 2.63 10*3/UL (ref 1.8–7.7)
NEUTROPHILS NFR SNV MANUAL: 54.1 % (ref 37–80)
PLATELETS: 235 10*3/UL (ref 142–424)
POTASSIUM SERPL-SCNC: 4.4 MMOL/L (ref 3.6–5.2)
RBC # BLD AUTO: 3.69 10*6/UL (ref 4.7–6.1)
SODIUM BLD-SCNC: 144 MMOL/L (ref 135–145)
WBC # BLD: 4.9 10*3/UL (ref 4.6–10.2)

## 2020-07-28 ENCOUNTER — OUTSIDE PLACE OF SERVICE (OUTPATIENT)
Dept: UROLOGY | Facility: CLINIC | Age: 75
End: 2020-07-28
Payer: MEDICARE

## 2020-07-28 PROCEDURE — 55821 REMOVAL OF PROSTATE: CPT | Mod: 78,,, | Performed by: SPECIALIST

## 2020-07-28 PROCEDURE — 55821 PR REMV PROSTATE,SUPRAPUBIC,SUBTOTAL: ICD-10-PCS | Mod: 78,,, | Performed by: SPECIALIST

## 2020-08-07 RX ORDER — HYOSCYAMINE SULFATE 0.12 MG/1
0.12 TABLET SUBLINGUAL EVERY 4 HOURS PRN
Qty: 20 TABLET | Refills: 0 | Status: SHIPPED | OUTPATIENT
Start: 2020-08-07 | End: 2020-09-18

## 2020-08-07 NOTE — TELEPHONE ENCOUNTER
Pt contacted clinic and requesting Levsin refill for spasms. Also was requesting to have Norco refiled nurse advised pt that he usually does not refill pain medication, pt verbalized understanding. NB

## 2020-08-10 ENCOUNTER — HOSPITAL ENCOUNTER (OUTPATIENT)
Dept: RADIOLOGY | Facility: CLINIC | Age: 75
Discharge: HOME | End: 2020-08-10
Attending: SPECIALIST
Payer: MEDICARE

## 2020-08-10 ENCOUNTER — OFFICE VISIT (OUTPATIENT)
Dept: UROLOGY | Facility: CLINIC | Age: 75
End: 2020-08-10
Payer: MEDICARE

## 2020-08-10 VITALS
RESPIRATION RATE: 18 BRPM | WEIGHT: 125 LBS | BODY MASS INDEX: 16.04 KG/M2 | HEART RATE: 113 BPM | HEIGHT: 74 IN | DIASTOLIC BLOOD PRESSURE: 61 MMHG | SYSTOLIC BLOOD PRESSURE: 125 MMHG

## 2020-08-10 DIAGNOSIS — Z90.79 STATUS POST PROSTATECTOMY: Primary | ICD-10-CM

## 2020-08-10 PROCEDURE — 99499 NO LOS: ICD-10-PCS | Mod: S$GLB,,, | Performed by: SPECIALIST

## 2020-08-10 PROCEDURE — 99499 UNLISTED E&M SERVICE: CPT | Mod: S$GLB,,, | Performed by: SPECIALIST

## 2020-08-10 NOTE — PROGRESS NOTES
Per Dr. Jon's orders, I removed pt's catheter. First I removed 40 cc of sterile water from cath balloon, then pulled out the 24 [place] adams catheter. Patient tolerated well.   Next, I removed the patient's staples from his incision in central lower abdomen/pelvic area. Removed 26 staples and then placed 12 steri strips over incision, instructing patient to protect the area from moisture for the next 7-10 days until the steri strips fall off. Patient verbalized understanding. ALIYA Stone LPN

## 2020-08-10 NOTE — PROGRESS NOTES
Subjective:       Patient ID: Jian Regalado is a 75 y.o. male.    Chief Complaint: Follow-up (post-op/adams removal)      HPI:  75-year-old man status post suprapubic prostatectomy.  He is here today for consideration for Adams catheter removal and staples removed.    He underwent a TURP on 06/09/2020 for BPH.  Prostate tissue removed weight 30.2 g. he continued to have symptoms of outflow obstruction.  With a cystoscopy in office in clearly had still had a lot of adenoma.  On 07/28/2020 he was taken to the operating room for suprapubic prostatectomy.  Benign prostate tissue with marked stromal and glandular hypertrophy and patchy chronic active inflammation was removed waiting 41 g. So a total of 71.2 g of prostate tissue had been removed as far.    Cystogram was performed office today in the bladder is healed completely.    Past Medical History:   Past Medical History:   Diagnosis Date    Arthritis     BPH with urinary obstruction     Neuropathy        Past Surgical Historical:   Past Surgical History:   Procedure Laterality Date    HIP REPLACEMENT ARTHROPLASTY      NECK SURGERY      TRANSURETHRAL RESECTION OF PROSTATE          Medications:   Medication List with Changes/Refills   Current Medications    ALPRAZOLAM (XANAX) 0.5 MG TABLET        CELECOXIB (CELEBREX) 200 MG CAPSULE    celecoxib 200 mg capsule    CYCLOBENZAPRINE (FLEXERIL) 10 MG TABLET        ESCITALOPRAM OXALATE (LEXAPRO) 10 MG TABLET        ESZOPICLONE (LUNESTA) 3 MG TAB    Take 3 mg by mouth nightly.    HYDROCODONE-ACETAMINOPHEN (NORCO) 5-325 MG PER TABLET        HYOSCYAMINE (LEVSIN/SL) 0.125 MG SUBL    Take 1 tablet (0.125 mg total) by mouth every 4 (four) hours as needed (bladder spasms).    MELOXICAM (MOBIC) 15 MG TABLET        POLY HIST FORTE 10.5-10 MG TAB    Take 1 tablet by mouth 2 (two) times daily.    PREGABALIN (LYRICA) 75 MG CAPSULE        TIOTROPIUM BROMIDE (SPIRIVA RESPIMAT) 2.5 MCG/ACTUATION MIST    Spiriva Respimat 2.5  mcg/actuation solution for inhalation        Past Social History:   Social History     Socioeconomic History    Marital status:      Spouse name: Not on file    Number of children: Not on file    Years of education: Not on file    Highest education level: Not on file   Occupational History    Not on file   Social Needs    Financial resource strain: Not on file    Food insecurity     Worry: Not on file     Inability: Not on file    Transportation needs     Medical: Not on file     Non-medical: Not on file   Tobacco Use    Smoking status: Current Every Day Smoker     Types: Cigarettes    Smokeless tobacco: Never Used   Substance and Sexual Activity    Alcohol use: Not on file    Drug use: Not on file    Sexual activity: Not on file   Lifestyle    Physical activity     Days per week: Not on file     Minutes per session: Not on file    Stress: Not on file   Relationships    Social connections     Talks on phone: Not on file     Gets together: Not on file     Attends Mandaen service: Not on file     Active member of club or organization: Not on file     Attends meetings of clubs or organizations: Not on file     Relationship status: Not on file   Other Topics Concern    Not on file   Social History Narrative    Not on file       Allergies: Review of patient's allergies indicates:  No Known Allergies     Family History:   Family History   Family history unknown: Yes        Physical Exam:  Gen:  Alert and oriented x3; no acute distress  HEENT: NC/AT; PERRLA, Moist mucous membranes  Chest: CTAB  CVS: RR, Normal Rate, Normal cap refills  Abd: S/NT/ND, staple line is clean and intact  :  Reyes catheter draining clear urine    Cystogram:  We did a three view cystogram after infusion of almost 200 cc in his bladder.  Anterior posterior, lateral and post drainage films were reviewed all of which were negative for any extravasation of contrast.      Assessment/Plan:       75-year-old man status post  suprapubic prostatectomy was here for follow-up.    1.  Remove Reyes catheter today  2.  Return to clinic in 6 weeks for interim check  3.  He is interested in testosterone replacement treatments on a 6 week visit we will talk more about that.    Problem List Items Addressed This Visit     None      Visit Diagnoses     Status post prostatectomy    -  Primary    Relevant Orders    FL Cystogram Minimum 3 Views (xpd) - Non Rad Performed

## 2020-09-15 ENCOUNTER — TELEPHONE (OUTPATIENT)
Dept: UROLOGY | Facility: CLINIC | Age: 75
End: 2020-09-15

## 2020-09-15 NOTE — TELEPHONE ENCOUNTER
Contacted pt spoke with spouse, who states that she will like a sooner appt for her , states he has been having trouble with his prostate since the procedure. Verbalized understanding. Scheduled for 9/18. BJTWIN      ----- Message from Venessa Olivas sent at 9/15/2020  9:00 AM CDT -----  Patient returning call to nurse. Call back number [phone]. Keyshawns

## 2020-09-18 ENCOUNTER — OFFICE VISIT (OUTPATIENT)
Dept: UROLOGY | Facility: CLINIC | Age: 75
End: 2020-09-18
Payer: MEDICARE

## 2020-09-18 VITALS
HEART RATE: 68 BPM | HEIGHT: 74 IN | WEIGHT: 118.5 LBS | BODY MASS INDEX: 15.21 KG/M2 | SYSTOLIC BLOOD PRESSURE: 147 MMHG | DIASTOLIC BLOOD PRESSURE: 75 MMHG

## 2020-09-18 DIAGNOSIS — N40.0 BPH WITHOUT URINARY OBSTRUCTION: ICD-10-CM

## 2020-09-18 DIAGNOSIS — N52.9 ERECTILE DYSFUNCTION, UNSPECIFIED ERECTILE DYSFUNCTION TYPE: Primary | ICD-10-CM

## 2020-09-18 PROCEDURE — 99213 PR OFFICE/OUTPT VISIT, EST, LEVL III, 20-29 MIN: ICD-10-PCS | Mod: 24,S$GLB,, | Performed by: SPECIALIST

## 2020-09-18 PROCEDURE — 3008F PR BODY MASS INDEX (BMI) DOCUMENTED: ICD-10-PCS | Mod: CPTII,S$GLB,, | Performed by: SPECIALIST

## 2020-09-18 PROCEDURE — 3008F BODY MASS INDEX DOCD: CPT | Mod: CPTII,S$GLB,, | Performed by: SPECIALIST

## 2020-09-18 PROCEDURE — 1101F PR PT FALLS ASSESS DOC 0-1 FALLS W/OUT INJ PAST YR: ICD-10-PCS | Mod: CPTII,S$GLB,, | Performed by: SPECIALIST

## 2020-09-18 PROCEDURE — 1101F PT FALLS ASSESS-DOCD LE1/YR: CPT | Mod: CPTII,S$GLB,, | Performed by: SPECIALIST

## 2020-09-18 PROCEDURE — 1159F PR MEDICATION LIST DOCUMENTED IN MEDICAL RECORD: ICD-10-PCS | Mod: S$GLB,,, | Performed by: SPECIALIST

## 2020-09-18 PROCEDURE — 99213 OFFICE O/P EST LOW 20 MIN: CPT | Mod: 24,S$GLB,, | Performed by: SPECIALIST

## 2020-09-18 PROCEDURE — 1159F MED LIST DOCD IN RCRD: CPT | Mod: S$GLB,,, | Performed by: SPECIALIST

## 2020-09-18 RX ORDER — SILDENAFIL CITRATE 20 MG/1
TABLET ORAL
Qty: 60 TABLET | Refills: 5 | Status: SHIPPED | OUTPATIENT
Start: 2020-09-18 | End: 2021-03-25

## 2020-09-18 NOTE — PROGRESS NOTES
Subjective:       Patient ID: Jian Regalado is a 75 y.o. male.    Chief Complaint: Follow-up (6 wk f/u)      HPI: 75-year-old man with longstanding BPH and lower urinary tract symptoms who is here for follow-up.     He underwent a TURP on 06/09/2020 for BPH.  Prostate tissue removed weighed 30.2 g. he continued to have symptoms of outflow obstruction.  He underwent cystoscopy in office in clearly had a lot of adenoma left behind.  On 07/28/2020 he was taken to the operating room for suprapubic prostatectomy.  Benign prostate tissue with marked stromal and glandular hypertrophy and patchy chronic active inflammation was removed weighing 41 g. So a total of 71.2 g of prostate tissue had been removed as far.    Patient is here today for his 6 week follow-up.  He continues to do well he voids to completion empties his bladder very well stream is very strong.  Has nocturia just once a night.  All in all he is very pleased with his symptom improvement.    He continues to deal with erectile problems.  He expresses the desire for sexual activity but he is just cannot sustain and keep an erection.  He had a serum testosterone level that was drawn on 07/07/2020 and was 379 ng/dL.  Even though patient is convinced that his problem be fixed by testosterone replacement, I think that his testosterone levels were within normal limits for his age.    He has PSAs done by his primary care physician he reports that he had one done within the last 3 months.  The last documented PSA in my records was from March of 2017.  When we reached out to his primary care physician's office we were told that he has not had a most recent PSA.    Past Medical History:   Past Medical History:   Diagnosis Date    Arthritis     BPH with urinary obstruction     Neuropathy        Past Surgical Historical:   Past Surgical History:   Procedure Laterality Date    HIP REPLACEMENT ARTHROPLASTY      NECK SURGERY      TRANSURETHRAL RESECTION OF PROSTATE           Medications:   Medication List with Changes/Refills   New Medications    SILDENAFIL (REVATIO) 20 MG TAB    Take as instructed.  Take 2-5 pills orally 1-2 hours prior to sexual activity on an empty stomach.   Current Medications    ALPRAZOLAM (XANAX) 0.5 MG TABLET        ESCITALOPRAM OXALATE (LEXAPRO) 10 MG TABLET        ESZOPICLONE (LUNESTA) 3 MG TAB    Take 3 mg by mouth nightly.    MELOXICAM (MOBIC) 15 MG TABLET        POLY HIST FORTE 10.5-10 MG TAB    Take 1 tablet by mouth 2 (two) times daily.    PREGABALIN (LYRICA) 75 MG CAPSULE        TIOTROPIUM BROMIDE (SPIRIVA RESPIMAT) 2.5 MCG/ACTUATION MIST    Spiriva Respimat 2.5 mcg/actuation solution for inhalation   Discontinued Medications    CELECOXIB (CELEBREX) 200 MG CAPSULE    celecoxib 200 mg capsule    CYCLOBENZAPRINE (FLEXERIL) 10 MG TABLET        HYDROCODONE-ACETAMINOPHEN (NORCO) 5-325 MG PER TABLET        HYOSCYAMINE (LEVSIN/SL) 0.125 MG SUBL    Take 1 tablet (0.125 mg total) by mouth every 4 (four) hours as needed (bladder spasms).        Past Social History:   Social History     Socioeconomic History    Marital status:      Spouse name: Not on file    Number of children: Not on file    Years of education: Not on file    Highest education level: Not on file   Occupational History    Not on file   Social Needs    Financial resource strain: Not on file    Food insecurity     Worry: Not on file     Inability: Not on file    Transportation needs     Medical: Not on file     Non-medical: Not on file   Tobacco Use    Smoking status: Current Every Day Smoker     Types: Cigarettes    Smokeless tobacco: Never Used   Substance and Sexual Activity    Alcohol use: Not on file    Drug use: Not on file    Sexual activity: Not on file   Lifestyle    Physical activity     Days per week: Not on file     Minutes per session: Not on file    Stress: Not on file   Relationships    Social connections     Talks on phone: Not on file     Gets together:  Not on file     Attends Yazidi service: Not on file     Active member of club or organization: Not on file     Attends meetings of clubs or organizations: Not on file     Relationship status: Not on file   Other Topics Concern    Not on file   Social History Narrative    Not on file       Allergies: Review of patient's allergies indicates:  No Known Allergies     Family History:   Family History   Family history unknown: Yes        Review of Systems:   systems reviewed and notable for erectile dysfunction, BPH.  All other systems were reviewed Neg except as stated in the HPI    Physical Exam:  General: A&Ox3. No apparent distress. No deformities.  Neck: No masses. Normal thyroid.  Lungs: normal inspiration. No use of accessory muscles.  Heart: normal pulse. No arrhythmias.  Abdomen: Soft. NT. ND. No masses. No hernias. No hepatosplenomegaly.  Lymphatic: Neck and groin nodes negative.  Skin: The skin is warm and dry. No jaundice.  Neurology: Cranial nerves 2-12 crossly intact, no focal weaknesses, no sensation deficits, no motor deficits  Ext: No clubbing, cyanosis or edema.  :  Deferred      Assessment/Plan:       75-year-old man who with BPH and lower urinary tract symptoms status post prostate intervention who now is dealing with problems with erections.    1.  I am going to restart him on sildenafil citrate.  I gave him instructions in how to administer this medication.  We talked about taking it on an empty stomach at least 1-2 hours prior to sexual activity with adequate for placed  2.  We reached out to his primary care physician's office he has not had a PSA in quite some time.  He will need a repeat PSA on return office visit.    Problem List Items Addressed This Visit     None      Visit Diagnoses     Erectile dysfunction, unspecified erectile dysfunction type    -  Primary    BPH without urinary obstruction

## 2021-03-25 ENCOUNTER — OFFICE VISIT (OUTPATIENT)
Dept: UROLOGY | Facility: CLINIC | Age: 76
End: 2021-03-25
Payer: MEDICARE

## 2021-03-25 VITALS
WEIGHT: 118 LBS | RESPIRATION RATE: 18 BRPM | BODY MASS INDEX: 15.14 KG/M2 | SYSTOLIC BLOOD PRESSURE: 118 MMHG | HEIGHT: 74 IN | DIASTOLIC BLOOD PRESSURE: 64 MMHG

## 2021-03-25 DIAGNOSIS — N52.9 ERECTILE DYSFUNCTION, UNSPECIFIED ERECTILE DYSFUNCTION TYPE: ICD-10-CM

## 2021-03-25 DIAGNOSIS — N40.0 BPH WITHOUT URINARY OBSTRUCTION: Primary | ICD-10-CM

## 2021-03-25 DIAGNOSIS — R68.82 DECREASED LIBIDO: ICD-10-CM

## 2021-03-25 PROCEDURE — 51798 US URINE CAPACITY MEASURE: CPT | Mod: S$GLB,,, | Performed by: SPECIALIST

## 2021-03-25 PROCEDURE — 99213 PR OFFICE/OUTPT VISIT, EST, LEVL III, 20-29 MIN: ICD-10-PCS | Mod: S$GLB,,, | Performed by: SPECIALIST

## 2021-03-25 PROCEDURE — 1159F PR MEDICATION LIST DOCUMENTED IN MEDICAL RECORD: ICD-10-PCS | Mod: S$GLB,,, | Performed by: SPECIALIST

## 2021-03-25 PROCEDURE — 99213 OFFICE O/P EST LOW 20 MIN: CPT | Mod: S$GLB,,, | Performed by: SPECIALIST

## 2021-03-25 PROCEDURE — 51798 POCT BLADDER SCAN: ICD-10-PCS | Mod: S$GLB,,, | Performed by: SPECIALIST

## 2021-03-25 PROCEDURE — 1159F MED LIST DOCD IN RCRD: CPT | Mod: S$GLB,,, | Performed by: SPECIALIST

## 2021-03-25 RX ORDER — TAMSULOSIN HYDROCHLORIDE 0.4 MG/1
CAPSULE ORAL
COMMUNITY
Start: 2021-01-27

## 2021-03-25 RX ORDER — TADALAFIL 20 MG/1
20 TABLET ORAL DAILY
Qty: 60 TABLET | Refills: 7 | Status: SHIPPED | OUTPATIENT
Start: 2021-03-25 | End: 2022-03-25

## 2021-03-26 LAB
POC RESIDUAL URINE VOLUME: NORMAL (ref 0–100)
PSA, DIAGNOSTIC: 2.59 NG/ML (ref 0–4)

## 2021-09-28 ENCOUNTER — OFFICE VISIT (OUTPATIENT)
Dept: UROLOGY | Facility: CLINIC | Age: 76
End: 2021-09-28
Payer: MEDICARE

## 2021-09-28 VITALS
SYSTOLIC BLOOD PRESSURE: 181 MMHG | HEIGHT: 74 IN | BODY MASS INDEX: 14.89 KG/M2 | WEIGHT: 116 LBS | HEART RATE: 55 BPM | DIASTOLIC BLOOD PRESSURE: 92 MMHG

## 2021-09-28 DIAGNOSIS — N40.0 BPH WITHOUT URINARY OBSTRUCTION: Primary | ICD-10-CM

## 2021-09-28 DIAGNOSIS — N52.9 ERECTILE DYSFUNCTION, UNSPECIFIED ERECTILE DYSFUNCTION TYPE: ICD-10-CM

## 2021-09-28 PROCEDURE — 1160F RVW MEDS BY RX/DR IN RCRD: CPT | Mod: CPTII,S$GLB,, | Performed by: NURSE PRACTITIONER

## 2021-09-28 PROCEDURE — 3080F PR MOST RECENT DIASTOLIC BLOOD PRESSURE >= 90 MM HG: ICD-10-PCS | Mod: CPTII,S$GLB,, | Performed by: NURSE PRACTITIONER

## 2021-09-28 PROCEDURE — 99214 PR OFFICE/OUTPT VISIT, EST, LEVL IV, 30-39 MIN: ICD-10-PCS | Mod: S$GLB,,, | Performed by: NURSE PRACTITIONER

## 2021-09-28 PROCEDURE — 3080F DIAST BP >= 90 MM HG: CPT | Mod: CPTII,S$GLB,, | Performed by: NURSE PRACTITIONER

## 2021-09-28 PROCEDURE — 1159F MED LIST DOCD IN RCRD: CPT | Mod: CPTII,S$GLB,, | Performed by: NURSE PRACTITIONER

## 2021-09-28 PROCEDURE — 3077F PR MOST RECENT SYSTOLIC BLOOD PRESSURE >= 140 MM HG: ICD-10-PCS | Mod: CPTII,S$GLB,, | Performed by: NURSE PRACTITIONER

## 2021-09-28 PROCEDURE — 1126F AMNT PAIN NOTED NONE PRSNT: CPT | Mod: CPTII,S$GLB,, | Performed by: NURSE PRACTITIONER

## 2021-09-28 PROCEDURE — 3077F SYST BP >= 140 MM HG: CPT | Mod: CPTII,S$GLB,, | Performed by: NURSE PRACTITIONER

## 2021-09-28 PROCEDURE — 1159F PR MEDICATION LIST DOCUMENTED IN MEDICAL RECORD: ICD-10-PCS | Mod: CPTII,S$GLB,, | Performed by: NURSE PRACTITIONER

## 2021-09-28 PROCEDURE — 99214 OFFICE O/P EST MOD 30 MIN: CPT | Mod: S$GLB,,, | Performed by: NURSE PRACTITIONER

## 2021-09-28 PROCEDURE — 1126F PR PAIN SEVERITY QUANTIFIED, NO PAIN PRESENT: ICD-10-PCS | Mod: CPTII,S$GLB,, | Performed by: NURSE PRACTITIONER

## 2021-09-28 PROCEDURE — 1160F PR REVIEW ALL MEDS BY PRESCRIBER/CLIN PHARMACIST DOCUMENTED: ICD-10-PCS | Mod: CPTII,S$GLB,, | Performed by: NURSE PRACTITIONER

## 2021-09-28 RX ORDER — LEVOCETIRIZINE DIHYDROCHLORIDE 5 MG/1
TABLET, FILM COATED ORAL
COMMUNITY
Start: 2021-08-02

## 2021-09-28 RX ORDER — CYCLOBENZAPRINE HCL 10 MG
TABLET ORAL
COMMUNITY
Start: 2021-09-03

## 2021-09-28 RX ORDER — ZOLPIDEM TARTRATE 10 MG/1
TABLET ORAL
COMMUNITY
Start: 2021-09-23